# Patient Record
Sex: MALE | Employment: FULL TIME | URBAN - METROPOLITAN AREA
[De-identification: names, ages, dates, MRNs, and addresses within clinical notes are randomized per-mention and may not be internally consistent; named-entity substitution may affect disease eponyms.]

---

## 2023-09-14 ENCOUNTER — LAB (OUTPATIENT)
Dept: LAB | Facility: CLINIC | Age: 28
End: 2023-09-14
Payer: COMMERCIAL

## 2023-09-14 ENCOUNTER — OFFICE VISIT (OUTPATIENT)
Dept: DERMATOLOGY | Facility: CLINIC | Age: 28
End: 2023-09-14
Attending: DERMATOLOGY
Payer: COMMERCIAL

## 2023-09-14 ENCOUNTER — TELEPHONE (OUTPATIENT)
Dept: DERMATOLOGY | Facility: CLINIC | Age: 28
End: 2023-09-14

## 2023-09-14 VITALS
SYSTOLIC BLOOD PRESSURE: 134 MMHG | HEART RATE: 95 BPM | OXYGEN SATURATION: 98 % | DIASTOLIC BLOOD PRESSURE: 83 MMHG | WEIGHT: 284 LBS

## 2023-09-14 DIAGNOSIS — L73.2 HIDRADENITIS SUPPURATIVA: ICD-10-CM

## 2023-09-14 DIAGNOSIS — L73.2 HIDRADENITIS SUPPURATIVA: Primary | ICD-10-CM

## 2023-09-14 LAB
DEPRECATED CALCIDIOL+CALCIFEROL SERPL-MC: 41 UG/L (ref 20–75)
HBA1C MFR BLD: 5.4 %

## 2023-09-14 PROCEDURE — 84630 ASSAY OF ZINC: CPT | Mod: 90 | Performed by: PATHOLOGY

## 2023-09-14 PROCEDURE — G0463 HOSPITAL OUTPT CLINIC VISIT: HCPCS | Performed by: DERMATOLOGY

## 2023-09-14 PROCEDURE — 82306 VITAMIN D 25 HYDROXY: CPT | Performed by: DERMATOLOGY

## 2023-09-14 PROCEDURE — 99000 SPECIMEN HANDLING OFFICE-LAB: CPT | Performed by: PATHOLOGY

## 2023-09-14 PROCEDURE — 83036 HEMOGLOBIN GLYCOSYLATED A1C: CPT | Performed by: DERMATOLOGY

## 2023-09-14 PROCEDURE — 36415 COLL VENOUS BLD VENIPUNCTURE: CPT | Performed by: PATHOLOGY

## 2023-09-14 PROCEDURE — 99205 OFFICE O/P NEW HI 60 MIN: CPT | Performed by: DERMATOLOGY

## 2023-09-14 RX ORDER — DOXYCYCLINE 100 MG/1
100 CAPSULE ORAL 2 TIMES DAILY
Qty: 28 CAPSULE | Refills: 3 | Status: SHIPPED | OUTPATIENT
Start: 2023-09-14

## 2023-09-14 RX ORDER — MONTELUKAST SODIUM 10 MG/1
10 TABLET ORAL DAILY
COMMUNITY
Start: 2022-03-09

## 2023-09-14 RX ORDER — FEXOFENADINE HCL 180 MG/1
180 TABLET ORAL DAILY
COMMUNITY

## 2023-09-14 RX ORDER — BUPROPION HYDROCHLORIDE 150 MG/1
1 TABLET ORAL EVERY MORNING
COMMUNITY
Start: 2022-03-11

## 2023-09-14 RX ORDER — CLINDAMYCIN PHOSPHATE 10 MG/G
GEL TOPICAL
Qty: 60 G | Refills: 11 | Status: SHIPPED | OUTPATIENT
Start: 2023-09-14

## 2023-09-14 RX ORDER — MULTIVITAMIN
1 TABLET ORAL DAILY
COMMUNITY

## 2023-09-14 ASSESSMENT — PAIN SCALES - GENERAL: PAINLEVEL: MILD PAIN (3)

## 2023-09-14 NOTE — PATIENT INSTRUCTIONS
Pilonidal abscess  - Recommend excision with colorectal surgery    Hidradenitis Suppurativa  - Benzoyl peroxide 4-10% wash. Leave on for 30 seconds and wash off. Bleaches towels. Over-the-counter.  - Clindamycin gel twice a day as needed for acne-like spots  - Sent letter to [facility] for laser hair removal  - Labs today  - Flare:   Doxycycline 100mg twice a day for 2 weeks (can cause gastrointestinal distress and sun sensitivity)    Hair loss   - minoxidil 5% foam twice a day   - could look into Hair Max laser hair comb      HIDRADENITIS SUPPURATIVA     What is hidradenitis suppurativa (HS)?  Hidradenitis suppurativa (HS) is a chronic skin disorder affecting the hair follicles. The disease starts with sore red lumps (or boils), typically involving the armpits, breasts, lower abdomen, groin, and buttocks. These lesions appear suddenly, increase in size and then burst or rupture, usually under the surface of the skin. This causes severe pain. Sometimes they rupture to the surface of the skin, draining pus. In some people, they can result in tunnels under the skin that may or may not continue to drain. When the lumps heal, they can leave scars.     Facts:   HS is a chronic skin disease, that comes and goes in flares, and is very painful  HS happens in many people - men and women, young and elderly, all races and ethnicities. But HS is more common in young adults, women  and skin of color  One out of three people with HS has a family member with HS   HS is NOT due to an infection or washing habits  HS is NOT contagious, so it cannot be spread from one person to another person   HS is NOT caused by how well you wash or what soaps you use  HS is NOT caused by smoking or obesity, but quitting smoking and losing weight have helped some people        Causes  The cause of HS remains unclear. It is thought that there is a problem in the hair follicle that makes it weaker and easier to break open. The current  theory is that there are three steps that cause an HS lesion to form::   The hair follicle gets plugged   The hair follicle swells and then ruptures, causing pain and inflammation   In some people, the inflammation does not stop and results in tunneling through the skin       Associated Conditions  HS is associated with several other medical conditions and activities including:  Tobacco use  High cholesterol, heart disease and stroke   Type 2 diabetes   Depression and anxiety   Arthritis, which causes stiffness and pain in joints   Inflammatory bowel disease (including Crohn's disease and ulcerative colitis)  Severe acne and pilonidal sinus/cyst  Polycystic ovarian syndrome  Skin cancer in areas of longstanding HS lesions, typically in the groin or buttocks (rare)    It is important to ask your physician to watch out for these conditions.      To help manage mental health issues related to HS and emotional support:  Help finding a mental health specialist: https://www.psychologyBoxaroo for [employer].Planet Metrics/us/therapists  Suicide prevention (24/7 free confidential support):   Phone: (713) 183-4323  Website: https://suicidepreventionlifeline.org/    Support groups:    Hope for HS: www.hopeforhs.org  HS Connect: www.hsconnect.org    Intimacy support: American Association of Sexuality Educators, Counselors and Therapists (AASECT) website: https://www.aasect.org    For help quitting smoking   Phone:  English: 1-205-YLVS-NOW (1-392.184.7394)  Urdu: 5-249-TOGPGI-AALIYAH (1-536.429.8386)    Website:   English: https://smokefree.gov/  Urdu: mayra.smokefree.gov     Lifestyle Modifications   Quitting smoking or weight loss can help your overall health and may help improve HS symptoms in some people, but not everyone.   It is recommended to eat a well-balanced, healthy diet (https://health.gov/dietaryguidelines) and to maintain a healthy weight. Avoiding dietary triggers can help some people suffering with HS, but will not necessarily help  others with HS.    Some people may find that friction, irritation, and rubbing may worsen HS symptoms. Some people do better with loose, breathable clothing and fabrics. Shaving close to the affected areas may also worsen HS in some people. But, not everyone has the same triggers for their HS, so see what works for you!    Some medications may worsen HS such as lithium, testosterone, and some progesterone-only birth control.  Please discuss this with your provider before stopping medication.     Zinc supplementation has been shown in some studies to help HS. However, every treatment can have a side effects,  so talk to your provider before starting any treatment.     Treatment                    Medicines, procedures and surgeries are offered to treat HS. Treatment can help reduce breakouts and improve quality of life.  Medicines used:  Topical washes (e.g. chlorhexidine, benzoyl peroxide)    Clindamycin on the skin - seems effective for pustules and papules. Probably not helpful for larger chronic lesions.     Oral antibiotics (e.g. doxycycline, clindamycin + rifampin, dapsone) - antibiotics may help some, but not all patients    Hormonal therapies (e.g. spironolactone, oral contraceptives) - primarily used in females though to have a hormonal component to their HS (e.g perimenstrual flares, worsening in pregnancy, polycystic ovarian syndrome)    Immunosuppression (e.g. prednisone)     Injectables (e.g. adalimumab, infliximab) - Adalimumab is the only federal drug administration (FDA) approved medication for HS  Education for adalimumab injections: https://www.Simtrol.com/PawSpotira-complete/injection  Adalimumab [facility] program:   Website: https://www.MedTel24/humSplore-complete/sign-up/  Phone: 9.888.4PHRWYD (1.783.624.5019)    Procedures:  Intralesional steroid injections - may help areas of acute active inflammation     Some hair removal lasers - If considering this option, we recommend doing this only with a  medical professional that has experience treating HS.     Surgeries:  Incision and drainage - Provides fast, short-term relief, but symptoms likely to recur.        Deroofing - This surgery involves opening the lesion and cleaning out the base. This treatment is effective for recurring lesions.  Recurrence rates seem to be similar to excision. These are typically left open and allowed to heal on their own over 1-3 months      Excision - This involves cutting out chronic lesions.  This may entail cutting out a large affected area referred to as wide local excision, or cutting out single lesions, referred to as localized excisions.  Excision may be done with a scalpel, electric heating device or laser (e.g. carbon dioxide [CO2] laser).  These are either allowed to heal on their own, closed with sutures, or closed with a graft or flap.  Grafts are typically done by taking skin from one site of the body and using it to close another part of the body.  Flaps are done by moving tissue around to close a wound.     Check out this website to help decide the best treatment options for you!     https://www.informed-decisions.org/hidradenitispda.php

## 2023-09-14 NOTE — TELEPHONE ENCOUNTER
Financial Counselor Review:    Please submit letter of medical necessity from Dr. Osullivan dated 09/14/2023 to insurance.  Procedure to be performed (include CPT code):  NdYag laser, 23657  Diagnosis:  hidradenitis suppurativa  ICD-10 code:  L73.2  # of treatments requested:  12  Sites: axilla, groin, thighs and buttock

## 2023-09-14 NOTE — NURSING NOTE
Chief Complaint   Patient presents with    Consult     /83 (BP Location: Right arm, Patient Position: Sitting, Cuff Size: Adult Large)   Pulse 95   Wt 128.8 kg (284 lb)   SpO2 98%

## 2023-09-14 NOTE — LETTER
9/14/2023       RE: Alexx Turner  [address]  [place] [address]     Dear Colleague,    Thank you for referring your patient, Alexx Turner, to the [facility] [place] at [facility]. Please see a copy of my visit note below.    [facility] Hidradenitis Suppurativa Clinic Visit  Encounter Date: Sep 14, 2023  Office Visit     Dermatology Problem List:  Likes to be called Claude  1. Hidradenitis Suppurativa/Pilonidal disease  2. Obesity  3. Depression  4. Tobacco use  5. Medical history: male pattern alopecia, low testosterone  __________________________________________    Assessment & Plan:   # Hidradenitis Suppurativa   -Pilonidal abscess  - Recommend excision with colorectal surgery    Hidradenitis Suppurativa  - Benzoyl peroxide 4-10% wash. Leave on for 30 seconds and wash off. Bleaches towels. Over-the-counter.  - Clindamycin gel twice a day as needed for acne-like spots  - Sent letter to [facility] for laser hair removal  -   - Flare:   Doxycycline 100mg twice a day for 2 weeks (can cause gastrointestinal distress and sun sensitivity)    Follow-up: 4 months       Staff and Fellow:   Scribe Disclosure:   I, Kerry Adorno ([email or web address]), am serving as a scribe to document services personally performed by this physician, Dr. Arnaud Osullivan, based on data collection and the provider's statements to me.     Provider Disclosure:   The documentation recorded by the scribe accurately reflects the services I personally performed and the decisions made by me.    Arnaud Osullivan MD, FAAD    Departments of Internal Medicine and Dermatology  [facility]  [phone]    More than half of the patient visit was spent counseling the patient on diagnosis/treatment.  Face-to-face time: 70 minutes.       ____________________________________________    CC: HS    HPI:  Mr. Alexx Turner is a(n)  28 year old male who presents today as a new patient for hidradenitis suppurativa. Pt first noticed symptoms 15 years ago in the axilla, groin, inner thigh, buttocks regions Has 8 flares/month which typically last 4 days. Pt notes that stress worsens his flares. Has not used a specific HS medication in the past. Is currently using ibuprofen, LHR (for groin) which provides moderate relief, reducing his symptoms by 50-60%.     Mom had HS which cleared up with smoking. Maternal cousin also had HS.      Doesn't think weight or tobacco effect his HS.     Screenings  - Depression/Anxiety: Yes (since HS). Taking sertraline and Welbutrin.   - Sexual dysfunction: Yes  - Tobacco use: Yes  - PCOS: No   - Obesity:   - DMII or Insulin Resistance: No   - Hypertension: No   - Hyperlipidemia: Unknown   - Inflammatory bowel disease: No   - Spondyloarthropathy: No    Pain meds as needed  - Ibuprofen 400mg 2-3 x a ay   - No tylenol     HS Nurse Assessment        9/14/2023    10:00 AM   Nurse Assessment Data   Over the past 30 days how many old lesions flared back up? 4   Over the past 30 days how many new lesions did you get? 1   Over the past week, how many dressing changes do you do each day? 1   Over the past week, has your wound drainage been: Mild   Rate your HS overall from 0 - 10 (0 = no disease, 10 = worst) over the past week:  3   Rate your pain score from 0 - 10 (0 = no disease, 10 = worst) for the most painful/symptomatic lesion in the past week:  3   Over the past week, how much has HS influenced your quality of life? slightly       Patient is otherwise feeling well, without additional skin concerns.    Physical Exam:  Vitals: /83 (BP Location: Right arm, Patient Position: Sitting, Cuff Size: Adult Large)   Pulse 95   Wt 128.8 kg (284 lb)   SpO2 98%     SKIN:   - No other lesions of concern on areas examined.     Medications:  Current Outpatient Medications   Medication Sig    buPROPion (WELLBUTRIN SR) 150 mg  Sustained-Release tablet Take 1 Tablet (150 mg) by mouth every morning.    finasteride (PROPECIA) 1 mg tablet Take 1 Tablet (1 mg) by mouth every morning.    montelukast (SINGULAIR) 10 mg tablet Take 1 Tablet (10 mg) by mouth at bedtime.    multivitamin (MVI) tablet Take 1 tablet by mouth once daily.    sertraline (ZOLOFT) 50 mg tablet      CC Referred Self, MD  No address on file on close of this encounter.

## 2023-09-14 NOTE — LETTER
"September 14, 2023    RE: Alexx Turner  [address] [place] [address]    To Whom This May Concern,     We are writing to request coverage of long-pulsed 1064-nm Nd: YAG laser treatment for recalcitrant hidradenitis suppurativa with significant effect on quality of life. This patient has symptoms of including intermittent folliculitis, inflammatory nodules and intermittent abscesses in the axilla, groin, thighs and buttock.     Hidradenitis responds well to ND-YAG laser hair removal as it is a follicular process. We would expect 1 treatment every 3- 6 weeks for a maximum of 12 treatments.     The CPT Code Description utilized would be 92375: Unlisted procedure, skin, mucous membrane and subcutaneous tissue.  The approximate cost is $325 per treatment. The only FDA covered treatment for this condition is weekly adalimumab. In addition to being more invasive, the cost of this intervention is much larger.     Finally, this treatment has been recommended in some situations by the North American HS Management Guidelines (J Am Acad Dermatol. 2020 Nov;83(5):4078-4648)     Please, see the following references:   Neeta Alexandre et al. \"Randomized control trial for the treatment of hidradenitis suppurativa with a neodymium?doped yttrium aluminium garnet laser.\" Dermatologic surgery 35.8 (2009): 6212-8238.     Heather Morel, et al. \"Histopathologic study of hidradenitis suppurativa following long-pulsed 1064-nm Nd: YAG laser treatment.\" Archives of dermatology 147.1 (2011): 21-28.   Hitesh Madera et al. \"Prospective controlled clinical and histopathologic study of hidradenitis suppurativa treated with the long-pulsed neodymium: yttrium-aluminium-garnet laser.\" Journal of the American Academy of Dermatology 62.4 (2010): 637-645.     We strive to provide our patient with outstanding care. Therefore, I request you please contact me at [phone] if you have any questions regarding coverage or our treatment " rational.       Sincerely,      Arnaud Osullivan MD, FAAD, FACP      Department of Dermatology   [facility]   Phone([facility]): [phone]

## 2023-09-14 NOTE — PROGRESS NOTES
[facility] Hidradenitis Suppurativa Clinic Visit  Encounter Date: Sep 14, 2023  Office Visit     Dermatology Problem List:  Likes to be called Claude  1. Hidradenitis Suppurativa/Pilonidal disease  2. Obesity  3. Depression  4. Tobacco use  5. Medical history: male pattern alopecia, low testosterone  __________________________________________    Assessment & Plan:   # Hidradenitis Suppurativa   -Pilonidal abscess  - Recommend excision with colorectal surgery    Hidradenitis Suppurativa  - Benzoyl peroxide 4-10% wash. Leave on for 30 seconds and wash off. Bleaches towels. Over-the-counter.  - Clindamycin gel twice a day as needed for acne-like spots  - Sent letter to [facility] for laser hair removal  -   - Flare:   Doxycycline 100mg twice a day for 2 weeks (can cause gastrointestinal distress and sun sensitivity)    Follow-up: 4 months       Staff and Fellow:   Scribe Disclosure:   I, Kerry Adorno ([email or web address]), am serving as a scribe to document services personally performed by this physician, Dr. Arnaud Osullivan, based on data collection and the provider's statements to me.     Provider Disclosure:   The documentation recorded by the scribe accurately reflects the services I personally performed and the decisions made by me.    Arnaud Osullivan MD, FAAD    Departments of Internal Medicine and Dermatology  [facility]  [phone]    More than half of the patient visit was spent counseling the patient on diagnosis/treatment.  Face-to-face time: 70 minutes.       ____________________________________________    CC: HS    HPI:  Mr. Alexx Turner is a(n) 28 year old male who presents today as a new patient for hidradenitis suppurativa. Pt first noticed symptoms 15 years ago in the axilla, groin, inner thigh, buttocks regions Has 8 flares/month which typically last 4 days. Pt notes that stress worsens his flares. Has not used a specific HS medication in the past. Is  currently using ibuprofen, LHR (for groin) which provides moderate relief, reducing his symptoms by 50-60%.     Mom had HS which cleared up with smoking. Maternal cousin also had HS.      Doesn't think weight or tobacco effect his HS.     Screenings  - Depression/Anxiety: Yes (since HS). Taking sertraline and Welbutrin.   - Sexual dysfunction: Yes  - Tobacco use: Yes  - PCOS: No   - Obesity:   - DMII or Insulin Resistance: No   - Hypertension: No   - Hyperlipidemia: Unknown   - Inflammatory bowel disease: No   - Spondyloarthropathy: No    Pain meds as needed  - Ibuprofen 400mg 2-3 x a ay   - No tylenol     HS Nurse Assessment        9/14/2023    10:00 AM   Nurse Assessment Data   Over the past 30 days how many old lesions flared back up? 4   Over the past 30 days how many new lesions did you get? 1   Over the past week, how many dressing changes do you do each day? 1   Over the past week, has your wound drainage been: Mild   Rate your HS overall from 0 - 10 (0 = no disease, 10 = worst) over the past week:  3   Rate your pain score from 0 - 10 (0 = no disease, 10 = worst) for the most painful/symptomatic lesion in the past week:  3   Over the past week, how much has HS influenced your quality of life? slightly       Patient is otherwise feeling well, without additional skin concerns.    Physical Exam:  Vitals: /83 (BP Location: Right arm, Patient Position: Sitting, Cuff Size: Adult Large)   Pulse 95   Wt 128.8 kg (284 lb)   SpO2 98%     SKIN:   - No other lesions of concern on areas examined.     Medications:  Current Outpatient Medications   Medication Sig    buPROPion (WELLBUTRIN SR) 150 mg Sustained-Release tablet Take 1 Tablet (150 mg) by mouth every morning.    finasteride (PROPECIA) 1 mg tablet Take 1 Tablet (1 mg) by mouth every morning.    montelukast (SINGULAIR) 10 mg tablet Take 1 Tablet (10 mg) by mouth at bedtime.    multivitamin (MVI) tablet Take 1 tablet by mouth once daily.    sertraline  (ZOLOFT) 50 mg tablet      CC Referred Self, MD  No address on file on close of this encounter.

## 2023-09-15 NOTE — TELEPHONE ENCOUNTER
PB DOS: TBD  Type of Procedure: NdYag laser  # of treatments requested:  12  Sites: axilla, groin, thighs and buttock    CPT Codes: 81005  ICD10 Codes:  L73.2    Surgeon/Ordering provider:  Dr. Osullivan [id number]  Pre-cert/Authorization completed:  pending, clinicals uploaded   Payer: [facility]  Spoke to [facility] portal  Ref. # [id number]/ Auth #   Valid Dates:     Please advise the patient to contact their insurance for coverage and benefits. Prior authorization is not a guarantee of payment. Payment is based on the patient's benefit plan documents such as copays, deductibles and out of pocket minimums.

## 2023-09-16 LAB — ZINC SERPL-MCNC: 81.7 UG/DL

## 2023-10-06 NOTE — TELEPHONE ENCOUNTER
"Good morning,    The PA request for NDYag Laser treatment was denied and deemed experimental or investigational. Here is a copy of the detailed denial letter:                If you should decide to move forward with a peer to peer you can call [phone].  Service Reference #: [id number]  Member ID: [id number]    IF you should decide to move forward with an appeal please create a letter of medical necessity and send it back to me.      If you have any further questions please feel free to reach out to me. Thank you for your attention on this.     Prior authorization(PA) or \"no PA required\" is not a guarantee for coverage.  Please advise the patient to contact insurance for their specific plan benefits.     Thank you,    PHIL Cannon  [facility] PA team   [phone]            "

## 2023-10-20 ENCOUNTER — TELEPHONE (OUTPATIENT)
Dept: DERMATOLOGY | Facility: CLINIC | Age: 28
End: 2023-10-20
Payer: COMMERCIAL

## 2023-10-20 NOTE — TELEPHONE ENCOUNTER
Left Voicemail (1st Attempt) for the patient to call back and schedule the following:    Appointment type: Return HS  Provider: Dr. Osullivan  Return date: January 2024  Specialty phone number: [phone]

## 2023-11-17 ENCOUNTER — TELEPHONE (OUTPATIENT)
Dept: DERMATOLOGY | Facility: CLINIC | Age: 28
End: 2023-11-17
Payer: COMMERCIAL

## 2023-11-17 NOTE — TELEPHONE ENCOUNTER
Left Voicemail (2nd Attempt) and Sent Mychart (2nd Attempt) for the patient to call back and schedule the following:    Appointment type: Return HS  Provider: Dr. Mulligan  Return date: Jan 2024  Specialty phone number: [phone]

## 2023-12-02 ENCOUNTER — HEALTH MAINTENANCE LETTER (OUTPATIENT)
Age: 28
End: 2023-12-02

## 2024-06-05 ENCOUNTER — TELEPHONE (OUTPATIENT)
Dept: DERMATOLOGY | Facility: CLINIC | Age: 29
End: 2024-06-05

## 2024-06-05 NOTE — TELEPHONE ENCOUNTER
Pt is next to come off ndyag wait list. Please confirm with pt if he is still wanting treatment. If so please scheduled telephone consult with Tristin.      Marium Camarena RN on 6/5/2024 at 10:35 AM

## 2024-07-09 ENCOUNTER — MYC MEDICAL ADVICE (OUTPATIENT)
Dept: DERMATOLOGY | Facility: CLINIC | Age: 29
End: 2024-07-09

## 2024-08-21 NOTE — TELEPHONE ENCOUNTER
Pt removed from wait list and added to inactive list. Pt will need a follow up appointment with Dr. Osullivan if he wishes to pursue treatment in the future.

## 2025-01-05 ENCOUNTER — HEALTH MAINTENANCE LETTER (OUTPATIENT)
Age: 30
End: 2025-01-05